# Patient Record
Sex: MALE | URBAN - METROPOLITAN AREA
[De-identification: names, ages, dates, MRNs, and addresses within clinical notes are randomized per-mention and may not be internally consistent; named-entity substitution may affect disease eponyms.]

---

## 2020-11-25 ENCOUNTER — EMERGENCY (EMERGENCY)
Facility: HOSPITAL | Age: 45
LOS: 0 days | Discharge: HOME | End: 2020-11-26
Attending: EMERGENCY MEDICINE
Payer: MEDICARE

## 2020-11-25 VITALS — WEIGHT: 151.9 LBS | HEIGHT: 68 IN

## 2020-11-25 DIAGNOSIS — R50.9 FEVER, UNSPECIFIED: ICD-10-CM

## 2020-11-25 DIAGNOSIS — U07.1 COVID-19: ICD-10-CM

## 2020-11-25 DIAGNOSIS — J12.89 OTHER VIRAL PNEUMONIA: ICD-10-CM

## 2020-11-25 DIAGNOSIS — J02.9 ACUTE PHARYNGITIS, UNSPECIFIED: ICD-10-CM

## 2020-11-25 DIAGNOSIS — R05 COUGH: ICD-10-CM

## 2020-11-25 LAB
ANION GAP SERPL CALC-SCNC: 6 MMOL/L — SIGNIFICANT CHANGE UP (ref 5–17)
BASOPHILS # BLD AUTO: 0 K/UL — SIGNIFICANT CHANGE UP (ref 0–0.2)
BASOPHILS NFR BLD AUTO: 0 % — SIGNIFICANT CHANGE UP (ref 0–2)
BUN SERPL-MCNC: 8 MG/DL — SIGNIFICANT CHANGE UP (ref 7–23)
CALCIUM SERPL-MCNC: 8.6 MG/DL — SIGNIFICANT CHANGE UP (ref 8.5–10.1)
CHLORIDE SERPL-SCNC: 103 MMOL/L — SIGNIFICANT CHANGE UP (ref 96–108)
CO2 SERPL-SCNC: 28 MMOL/L — SIGNIFICANT CHANGE UP (ref 22–31)
CREAT SERPL-MCNC: 0.85 MG/DL — SIGNIFICANT CHANGE UP (ref 0.5–1.3)
EOSINOPHIL # BLD AUTO: 0 K/UL — SIGNIFICANT CHANGE UP (ref 0–0.5)
EOSINOPHIL NFR BLD AUTO: 0 % — SIGNIFICANT CHANGE UP (ref 0–6)
GLUCOSE SERPL-MCNC: 106 MG/DL — HIGH (ref 70–99)
HCT VFR BLD CALC: 40.5 % — SIGNIFICANT CHANGE UP (ref 39–50)
HGB BLD-MCNC: 13.7 G/DL — SIGNIFICANT CHANGE UP (ref 13–17)
IMM GRANULOCYTES NFR BLD AUTO: 0.4 % — SIGNIFICANT CHANGE UP (ref 0–1.5)
LYMPHOCYTES # BLD AUTO: 1.19 K/UL — SIGNIFICANT CHANGE UP (ref 1–3.3)
LYMPHOCYTES # BLD AUTO: 16.1 % — SIGNIFICANT CHANGE UP (ref 13–44)
MCHC RBC-ENTMCNC: 29.2 PG — SIGNIFICANT CHANGE UP (ref 27–34)
MCHC RBC-ENTMCNC: 33.8 GM/DL — SIGNIFICANT CHANGE UP (ref 32–36)
MCV RBC AUTO: 86.4 FL — SIGNIFICANT CHANGE UP (ref 80–100)
MONOCYTES # BLD AUTO: 0.35 K/UL — SIGNIFICANT CHANGE UP (ref 0–0.9)
MONOCYTES NFR BLD AUTO: 4.7 % — SIGNIFICANT CHANGE UP (ref 2–14)
NEUTROPHILS # BLD AUTO: 5.84 K/UL — SIGNIFICANT CHANGE UP (ref 1.8–7.4)
NEUTROPHILS NFR BLD AUTO: 78.8 % — HIGH (ref 43–77)
PLATELET # BLD AUTO: 187 K/UL — SIGNIFICANT CHANGE UP (ref 150–400)
POTASSIUM SERPL-MCNC: 3.8 MMOL/L — SIGNIFICANT CHANGE UP (ref 3.5–5.3)
POTASSIUM SERPL-SCNC: 3.8 MMOL/L — SIGNIFICANT CHANGE UP (ref 3.5–5.3)
RBC # BLD: 4.69 M/UL — SIGNIFICANT CHANGE UP (ref 4.2–5.8)
RBC # FLD: 13.2 % — SIGNIFICANT CHANGE UP (ref 10.3–14.5)
SODIUM SERPL-SCNC: 137 MMOL/L — SIGNIFICANT CHANGE UP (ref 135–145)
WBC # BLD: 7.41 K/UL — SIGNIFICANT CHANGE UP (ref 3.8–10.5)
WBC # FLD AUTO: 7.41 K/UL — SIGNIFICANT CHANGE UP (ref 3.8–10.5)

## 2020-11-25 PROCEDURE — 80048 BASIC METABOLIC PNL TOTAL CA: CPT

## 2020-11-25 PROCEDURE — 71045 X-RAY EXAM CHEST 1 VIEW: CPT | Mod: 26

## 2020-11-25 PROCEDURE — 85025 COMPLETE CBC W/AUTO DIFF WBC: CPT

## 2020-11-25 PROCEDURE — 71045 X-RAY EXAM CHEST 1 VIEW: CPT

## 2020-11-25 PROCEDURE — 36415 COLL VENOUS BLD VENIPUNCTURE: CPT

## 2020-11-25 PROCEDURE — 96374 THER/PROPH/DIAG INJ IV PUSH: CPT

## 2020-11-25 PROCEDURE — 99284 EMERGENCY DEPT VISIT MOD MDM: CPT

## 2020-11-25 PROCEDURE — 99284 EMERGENCY DEPT VISIT MOD MDM: CPT | Mod: 25

## 2020-11-25 RX ORDER — IBUPROFEN 200 MG
600 TABLET ORAL ONCE
Refills: 0 | Status: COMPLETED | OUTPATIENT
Start: 2020-11-25 | End: 2020-11-25

## 2020-11-25 RX ORDER — METOCLOPRAMIDE HCL 10 MG
10 TABLET ORAL ONCE
Refills: 0 | Status: COMPLETED | OUTPATIENT
Start: 2020-11-25 | End: 2020-11-25

## 2020-11-25 RX ORDER — ACETAMINOPHEN 500 MG
1000 TABLET ORAL ONCE
Refills: 0 | Status: COMPLETED | OUTPATIENT
Start: 2020-11-25 | End: 2020-11-25

## 2020-11-25 RX ORDER — SODIUM CHLORIDE 9 MG/ML
1000 INJECTION INTRAMUSCULAR; INTRAVENOUS; SUBCUTANEOUS ONCE
Refills: 0 | Status: COMPLETED | OUTPATIENT
Start: 2020-11-25 | End: 2020-11-25

## 2020-11-25 RX ADMIN — Medication 600 MILLIGRAM(S): at 21:02

## 2020-11-25 RX ADMIN — SODIUM CHLORIDE 1000 MILLILITER(S): 9 INJECTION INTRAMUSCULAR; INTRAVENOUS; SUBCUTANEOUS at 22:29

## 2020-11-25 RX ADMIN — Medication 10 MILLIGRAM(S): at 22:29

## 2020-11-25 NOTE — ED PROVIDER NOTE - PROGRESS NOTE DETAILS
Reveived s/o from dr Thompson pending IVFs. pt sp ivfs. remains well appearing. PoX 96% on RA. dc home ,MD

## 2020-11-25 NOTE — ED PROVIDER NOTE - NSFOLLOWUPINSTRUCTIONS_ED_ALL_ED_FT
Log Out.      [employer]®     :  [facility]  	                       COVID-19 (CORONAVIRUS DISEASE 2019) - AfterCare(R) Instructions(ER/ED)           COVID-19 (Coronavirus Disease 2019)    WHAT YOU NEED TO KNOW:    COVID-19 is the disease caused by the novel (new) coronavirus first discovered in December 2019. Coronaviruses generally cause upper respiratory (nose, throat, and lung) infections, such as a cold. The new virus can also cause serious lower respiratory conditions, such as pneumonia or acute respiratory distress syndrome (ARDS). Anyone can develop serious problems from the new virus, but your risk is higher if you are 65 or older. A weak immune system, diabetes, or a heart or lung condition can also increase your risk.    DISCHARGE INSTRUCTIONS:    If you think you or someone you know may be infected: Do the following to protect others:   •If emergency care is needed, tell the  about the possible infection, or call ahead and tell the emergency department.      •Call a healthcare provider for instructions if symptoms are mild. Anyone who may be infected should not arrive without calling first. The provider will need to protect staff members and other patients.      •The person who may be infected needs to wear a face covering while getting medical care. This will help lower the risk of infecting others. Coverings are not used for anyone who is younger than 2 years, has breathing problems, or cannot remove it. The provider can give you instructions for anyone who cannot wear a covering.      Call your local emergency number (911 in the [place]) or go to the emergency department if:   •You have trouble breathing or shortness of breath at rest.      •You have chest pain or pressure that lasts longer than 5 minutes.      •You become confused or hard to wake.      •Your lips or face are blue.      •You have a fever of 104°F (40°C) or higher.      Call your doctor if:   •You do not have symptoms of COVID-19 but had close physical contact within 14 days with someone who tested positive.      •You have questions or concerns about your condition or care.      Medicines: You may need any of the following for mild symptoms:   •Decongestants help reduce nasal congestion and help you breathe more easily. If you take decongestant pills, they may make you feel restless or cause problems with your sleep. Do not use decongestant sprays for more than a few days.      •Cough suppressants help reduce coughing. Ask your healthcare provider which type of cough medicine is best for you.      •Acetaminophen decreases pain and fever. It is available without a doctor's order. Ask how much to take and how often to take it. Follow directions. Read the labels of all other medicines you are using to see if they also contain acetaminophen, or ask your doctor or pharmacist. Acetaminophen can cause liver damage if not taken correctly. Do not use more than 4 grams (4,000 milligrams) total of acetaminophen in one day.       •NSAIDs, such as ibuprofen, help decrease swelling, pain, and fever. NSAIDs can cause stomach bleeding or kidney problems in certain people. If you take blood thinner medicine, always ask your healthcare provider if NSAIDs are safe for you. Always read the medicine label and follow directions.      •Take your medicine as directed. Contact your healthcare provider if you think your medicine is not helping or if you have side effects. Tell him or her if you are allergic to any medicine. Keep a list of the medicines, vitamins, and herbs you take. Include the amounts, and when and why you take them. Bring the list or the pill bottles to follow-up visits. Carry your medicine list with you in case of an emergency.      How the 2019 coronavirus spreads: The virus spreads quickly and easily. You can become infected if you are in contact with a large amount of the virus, even for a short time. You can also become infected by being around a small amount of virus for a long time. The following are ways the virus is thought to spread, but more information may be coming:   •Droplets are the most common way all coronaviruses spread. The virus can travel in droplets that form when a person talks, coughs, or sneezes. Anyone who breathes in the droplets or gets them in his or her eyes can become infected with the virus. Close personal contact with an infected person is thought to be the main way the virus spreads. Close personal contact means you are within 6 feet (2 meters) of the person.      •Person-to-person contact can spread the virus. For example, a person with the virus on his or her hands can spread it by shaking hands with someone. At this time, it does not appear that the virus can be passed to a baby during pregnancy or delivery. The baby can be infected after he or she is born through person-to-person contact. The virus also does not appear to spread in breast milk. If you are pregnant or breastfeeding, talk to your healthcare provider or obstetrician about any concerns you have.      •The virus can stay on objects and surfaces. A person can get the virus on his or her hands by touching the object or surface. Infection happens if the person then touches his or her eyes or mouth with unwashed hands. It is not yet known how long the virus can stay on an object or surface. That is why it is important to clean all surfaces that are used regularly.      •An infected animal may be able to infect a person who touches it. This may happen at live markets or on a farm.      How everyone can lower the risk for COVID-19: The best way to prevent infection is to avoid anyone who is infected, but this can be hard to do. An infected person can spread the virus before signs or symptoms begin, or even if signs or symptoms never develop. The following can help lower the risk for infection:     Limit the Spread of Infectious Disease     •Wash your hands often throughout the day. Use soap and water. Rub your soapy hands together, lacing your fingers. Wash the front and back of each hand, and in between your fingers. Use the fingers of one hand to scrub under the fingernails of the other hand. Wash for at least 20 seconds. Rinse with warm, running water for several seconds. Then dry your hands with a clean towel or paper towel. Use hand  that contains alcohol if soap and water are not available. Do not touch your eyes, nose, or mouth without washing your hands first. Teach children how to wash their hands and use hand .  Handwashing           •Cover a sneeze or cough. This prevents droplets from traveling from you to others. Turn your face away and cover your mouth and nose with a tissue. Throw the tissue away. Use the bend of your arm if a tissue is not available. Then wash your hands well with soap and water or use hand . Turn and cover your face if you are around someone who is sneezing or coughing. Teach children how to cover a cough or sneeze.      •Follow worldwide, national, and local social distancing guidelines. Social distancing means people avoid close physical contact so the virus cannot spread from one person to another. Keep at least 6 feet (2 meters) between you and others. Also keep this distance from anyone who comes to your home, such as someone making a delivery.      •Make a habit of not touching your face. It is not known how long the virus can stay on objects and surfaces. If you get the virus on your hands, you can transfer it to your eyes, nose, or mouth and become infected. You can also transfer it to objects, surfaces, or people. Be aware of what you touch when you go out. Examples include handrails and elevator buttons. Try not to touch anything with bare hands unless it is necessary. Wash your hands before you leave your home and when you return.      •Clean and disinfect high-touch surfaces and objects often. Use a disinfecting solution or wipes. You can make a solution by diluting 4 teaspoons of bleach in 1 quart (4 cups) of water. Clean and disinfect even if you think no one living in or coming to your home is infected with the virus. You can wipe items with a disinfecting cloth before you bring them into your home. Wash your hands after you handle anything you bring into your home.      •Make your immune system as healthy as possible. A weakened immune system makes you more vulnerable to the new coronavirus. No COVID-19 vaccine is available yet. Vaccines such as the flu and pneumonia vaccines can help your immune system. Your healthcare provider can tell you which vaccines to get, and when to get them. Keep your immune system as strong as possible. Do not smoke. Eat healthy foods, exercise regularly, and try to manage stress. Go to bed and wake up at the same times each day.   Healthy Foods           Social distancing guidelines: National and local social distancing rules vary. Rules may change over time as restrictions are lifted. Restrictions may return if an outbreak happens where you live. It is important to know and follow all current social distancing rules in your area. The following are general guidelines:  •Limit trips out of your home. You may be able to have food, medicines, and other supplies delivered. If possible, have delivered items left at your door or other area. Try not to have someone hand you an item. You will be so close to the person that the virus can spread between you.      •Do not have close physical contact with anyone who does not live in your home. Do not shake hands with, hug, or kiss a person as a greeting. Stand or walk as far from others as possible. If you must use public transportation (such as a bus or subway), try to sit or stand away from others. You can stay safely connected with others through phone calls, e-mail messages, social media websites, and video chats. Check in on anyone who may be having a hard time socially distancing, or who lives alone. Ask administrators at nursing homes or long-term care facilities how you can safely communicate with someone living there.      •Wear a cloth face covering around others who do not live in your home. Face coverings help prevent the virus from spreading to others in droplets. You can use a clear face covering if someone needs to read your lips. This is a cloth covering that has plastic over the mouth area so your lips can be seen. Do not use coverings that have breathing valves or vents. The virus can travel out of the valve or vent and be spread to others. Do not take your covering off to talk, cough, or sneeze. Do not use coverings on children younger than 2 years or on anyone who has breathing problems or cannot remove it.      •Only allow medical or other necessary professionals into your home. Wear your face covering, and remind professionals to wear a face covering. Remind them to wash their hands when they arrive and before they leave. Do not let anyone who does not live in your home in, even if the person is not sick. A person can pass the virus to others before symptoms of COVID-19 begin. Some people never even develop symptoms. Children commonly have mild symptoms or no symptoms. It may be hard to tell a child not to hug or kiss you. Explain that this is how he or she can help you stay healthy.      •Do not go to someone else's home unless it is necessary. Do not go over to visit, even if the person is lonely. Only go if you need to help him or her. Make sure you both wear face coverings while you are there.      •Avoid large gatherings and crowds. Gatherings or crowds of 10 or more individuals can cause the virus to spread. Examples of gatherings include parties, sporting events, [employer] services, and conferences. Crowds may form at beaches, chi, and tourist attractions. Protect yourself by staying away from large gatherings and crowds.      •Ask your healthcare provider for other ways to have appointments. You may be able to have appointments without having to go into the provider's office. Some providers offer phone, video, or other types of appointments. You may also be able to get prescriptions for a few months of your medicines at a time.      •Stay safe if you must go out to work. You may have a job that can only be done outside your home. Keep physical distance between you and other workers as much as possible. Follow your employer's rules so everyone stays safe.      If you have COVID-19 and are recovering at home: Healthcare providers will give you specific instructions to follow. The following are general guidelines to remind you how to keep others safe until you are well:   •Wash your hands often. Use soap and water as much as possible. You can use hand  that contains alcohol if soap and water are not available. Do not share towels with anyone. If you use paper towels, throw them away in a lined trash can kept in your room or area. Use a covered trash can, if possible.      •Do not go out of your home unless it is necessary. You may have to go to your healthcare provider's office for check-ups or to get prescription refills. Do not arrive at the provider's office without an appointment. Providers have to make their offices safe for staff and other patients.      •Do not have close physical contact with anyone unless it is necessary. Only have close physical contact with a person giving direct care, or a baby or child you must care for. Family members and friends should not visit you. If possible, stay in a separate area or room of your home if you live with others. No one should go into the area or room except to give you care. You can visit with others by phone, video chat, e-mail, or similar systems. It is important to stay connected with others in your life while you recover.      •Wear a face covering while others are near you. This can help prevent droplets from spreading the virus when you talk, sneeze, or cough. Put the covering on before anyone comes into your room or area. Remind the person to cover his or her nose and mouth before going in to provide care for you.      •Do not share items. Do not share dishes, towels, or other items with anyone. Items need to be washed after you use them.      •Protect your baby. Wash your hands with soap and water often throughout the day. Wear a clean face covering while you breastfeed, or while you express or pump breast milk. If possible, ask someone who is well to care for your baby. You can put breast milk in bottles for the person to use, if needed. Talk to your healthcare provider if you have any questions or concerns about caring for or bonding with your baby. He or she will tell you when to bring your baby in for check-ups and vaccines. He or she will also tell you what to do if you think your baby was infected with the new virus.      •Do not handle live animals. Until more is known, it is best not to touch, play with, or handle live animals. Some animals, including pets, have been infected with the new coronavirus. Do not handle or care for animals until you are well. Care includes feeding, petting, and cuddling your pet. Do not let your pet lick you or share your food. Ask someone who is not infected to take care of your pet, if possible. If you must care for a pet, wear a face covering. Wash your hands before and after you give care.      •Follow directions from your healthcare provider for being around others after you recover. You will need to wait at least 10 days after symptoms first appeared. Then you will need to have no fever for 24 hours without fever medicine, and no other symptoms. A loss of taste or smell may continue for several months. It is considered okay to be around others if this is your only symptom. It is not known for sure if or for how long a recovered person can pass the virus to others. Your provider may give you instructions, such as continuing social distancing or wearing a face covering around others.      How to take care of someone who has COVID-19: If the person lives in another home, arrange for a time to give care. Remember to bring a few pairs of disposable gloves and a cloth face covering. The following are general guidelines to help you safely care for anyone who has COVID-19:  •Wash your hands often. Wash before and after you go into the person's home, area, or room. Throw paper towels away in a lined trash can that has a lid, if possible.      •Do not allow others to go near the person. No one should come into the person's home unless it is necessary. If possible, the person should be in a separate area or room if he or she lives with others. Keep the room's door shut unless you need to go in or out. Have others call, video chat, or e-mail the person if he or she is feeling well enough. The person may feel lonely if he or she is kept separate for a long period of time. Safe communication can help him or her stay connected to family and friends.      •Make sure the person's room has good air flow. You may be able to open the window if the weather allows. An air conditioner can also be turned on to help air move.      •Contact the person before you go in to give care. Make sure the person is wearing a face covering. Remind him or her to wash his or her hands with soap and water. He or she can use hand  that contains alcohol if soap and water are not available. Put on a face covering before you go in to give care.      •Wear gloves while you give care and clean. Clean items the person uses often. Clean countertops, cooking surfaces, and the fronts and insides of the microwave and refrigerator. Clean the shower, toilet, the area around the toilet, the sink, the area around the sink, and faucets. Gather used laundry or bedding. Wash and dry items on the warmest settings the fabric allows. Wash dishes and silverware in hot, soapy water or in a .      •Anything you throw away needs to go into a lined trash can. When you need to empty the trash, close the open end of the lining and tie it closed. This helps prevent items the virus is on from spilling out of the trash. Remove your gloves and throw them away. Wash your hands.      Follow up with your doctor as directed: Write down your questions so you remember to ask them during your visits.    For more information:   •[employer] for Disease Control and Prevention  [address] [place] [address]  Phone: 1-548.356.6098  Web Address: http://www.cdc.gov           © Copyright [employer] 2020           back to top                          © Copyright [employer] 2020

## 2020-11-25 NOTE — ED PROVIDER NOTE - CLINICAL SUMMARY MEDICAL DECISION MAKING FREE TEXT BOX
Covid positive pt with O2 stat of 92 percent on rest. Pt maintains this on exertion. chest x-ray indicative of COVID and PNA. Will present IV fluids since pt feels lightheaded. Covid positive pt with O2 stat of 94/95 percent on rest. Pt maintains this on exertion. chest x-ray indicative of COVID and PNA. Will present IV fluids since pt feels lightheaded.

## 2020-11-25 NOTE — ED PROVIDER NOTE - OBJECTIVE STATEMENT
46 y/o male with PMHx of Parkinson's disease presents to the ED c/o subjective fever for past 7 day. Pt is tested COVID positive.  Pt also reports +generalized fatigue, +malaise, +lightheadedness, +cough, and +sore throat. No other complaints at this time. 44 y/o male with PMHx of Parkinson's disease presents to the ED c/o subjective fever for past 7 day. Pt tested COVID positive.  Pt also reports +generalized fatigue, +malaise, +lightheadedness, +cough, and +sore throat. No other complaints at this time. no sob

## 2020-11-25 NOTE — ED PROVIDER NOTE - CARE PLAN
Principal Discharge DX:	COVID-19   Principal Discharge DX:	COVID-19  Secondary Diagnosis:	Viral pneumonia  Secondary Diagnosis:	Fever

## 2020-11-25 NOTE — ED ADULT NURSE NOTE - OBJECTIVE STATEMENT
COVID (+) pt. presents to ED with fevers and headache and poor appetite for 1 week. pt. took 1g tylenol PO 1hr PTA. pt. denies other symptoms such as SOB, DORAN, palpitations, N/V/D no respiratory distress noted at this moment. SPO2 94-95% RA

## 2020-11-25 NOTE — ED PROVIDER NOTE - PATIENT PORTAL LINK FT
You can access the FollowMyHealth Patient Portal offered by [facility] by registering at the following website: [email or web address]. By joining [email or web address]’s FollowMyHealth portal, you will also be able to view your health information using other applications (apps) compatible with our system.

## 2020-11-25 NOTE — ED PROVIDER NOTE - NS ED ROS FT
Review of Systems:  	•	CONSTITUTIONAL: no fever  	•	SKIN: no rash  	•	RESPIRATORY: no shortness of breath  	•	CARDIAC: no chest pain, no palpitations  	•	GI:  no abd pain, no nausea, no vomiting, no diarrhea  	•	GENITO-URINARY:  no dysuria; no hematuria    	•	MUSCULOSKELETAL:  no back pain  	•	NEUROLOGIC: no weakness  	•	ALLERGY: no rhinitis  	•	PSYCHIATRIC: no anxiety Review of Systems:  	•	CONSTITUTIONAL: + fever. +generalized fatigue, +malaise  	•	SKIN: no rash  	•	RESPIRATORY: no shortness of breath. +sore throat  	•	CARDIAC: no chest pain, no palpitations  	•	GI:  no abd pain, no nausea, no vomiting, no diarrhea  	•	GENITO-URINARY:  no dysuria; no hematuria    	•	MUSCULOSKELETAL:  no back pain  	•	NEUROLOGIC: no weakness, +lightheadedness   	•	ALLERGY: no rhinitis  	•	PSYCHIATRIC: no anxiety

## 2020-11-25 NOTE — ED ADULT TRIAGE NOTE - CHIEF COMPLAINT QUOTE
hx parkinsons, as per ems patient tested + for covid, members in household are also covid+, patient complaining of 102.8 fever, directly bedded to room 17. Vitals stable, no acute distress noted.

## 2020-11-26 VITALS
OXYGEN SATURATION: 94 % | SYSTOLIC BLOOD PRESSURE: 110 MMHG | HEART RATE: 80 BPM | RESPIRATION RATE: 18 BRPM | TEMPERATURE: 99 F | DIASTOLIC BLOOD PRESSURE: 69 MMHG

## 2024-10-15 NOTE — ED ADULT TRIAGE NOTE - PAIN: PRESENCE, MLM
Patient identification verified with 2 identifiers.    Location: [facility] [id number] [address] [phone] option #1     Referring Physician: DR. VLAD MATOS  Enrollment/ Re-enrollment date: 10/8/2025   INR Goal: 2.0-3.0  INR monitoring is per [facility] protocol.  Anticoagulation Medication: warfarin  Indication: Deep Vein Thrombosis (DVT)    Subjective   Bleeding signs/symptoms: No    Bruising: No   Major bleeding event: No  Thrombosis signs/symptoms: No  Thromboembolic event: No  Missed doses: No  Extra doses: No  Medication changes: No  Dietary changes: No  Change in health: No  Change in activity: No  Alcohol: No  Other concerns: No    Upcoming Procedures:  Does the Patient Have any upcoming procedures that require interruption in anticoagulation therapy? no  Does the patient require bridging? no      Anticoagulation Summary  As of 10/15/2024      INR goal:  2.0-3.0   TTR:  57.4% (6.1 mo)   INR used for dosin.10 (10/15/2024)   Weekly warfarin total:  13 mg               Assessment/Plan   Therapeutic     1. New dose: no change    2. Next INR: 1 week      Education provided to patient during the visit:  Patient instructed to call in interim with questions, concerns and changes.   Patient educated on compliance with dosing, follow up appointments, and prescribed plan of care.         denies pain/discomfort